# Patient Record
Sex: MALE | URBAN - METROPOLITAN AREA
[De-identification: names, ages, dates, MRNs, and addresses within clinical notes are randomized per-mention and may not be internally consistent; named-entity substitution may affect disease eponyms.]

---

## 2019-04-17 ENCOUNTER — TELEPHONE (OUTPATIENT)
Dept: PULMONOLOGY | Age: 74
End: 2019-04-17

## 2019-04-17 NOTE — TELEPHONE ENCOUNTER
Pt called office requesting an appt with Dr Manav Michael- pt said Dr Manav Michael put him on 02  3/4 yrs ago. No records in [address] [place] [address] - nothing found that pt was ever seen in our office. I informed him we will need a new pt referral.  Pt voiced understanding.

## 2020-07-06 LAB — DIAGNOSTIC PSA: < 0.06 NG/ML (ref 0–4)

## 2020-12-14 LAB
BACTERIA, URINE: ABNORMAL
BILIRUBIN URINE: NEGATIVE
BLOOD, URINE: ABNORMAL
CASTS UA: ABNORMAL
CLARITY: ABNORMAL
COLOR, URINE: ABNORMAL
CRYSTALS, UA: ABNORMAL
DIAGNOSTIC PSA: < 0.06 NG/ML (ref 0–4)
GLUCOSE URINE: NEGATIVE MG/DL
KETONES, URINE: NEGATIVE MG/DL
LEUKOCYTE ESTERASE, URINE: NEGATIVE
MUCUS, URINE: ABNORMAL
NITRITE, URINE: NEGATIVE
PH UA: 6 (ref 5–8.5)
PROTEIN UA: ABNORMAL MG/DL
RBC URINE: ABNORMAL (ref 0–2)
SPECIFIC GRAVITY, URINE: 1.02 MG/DL (ref 1–1.03)
SQUAMOUS EPITHELIAL: ABNORMAL
TRICHOMONAS, URINE: ABNORMAL
UROBILINOGEN, URINE: 1 MG/DL (ref 0.2–1)
WBC URINE: ABNORMAL (ref 0–4)
YEAST, URINE: ABNORMAL

## 2021-07-23 LAB
PROSTATE SPECIFIC ANTIGEN FREE: <0.1 UG/L
PROSTATE SPECIFIC ANTIGEN PERCENT FREE: NORMAL %
PROSTATIC SPECIFIC AG: <0.1 UG/L (ref 0–4)

## 2022-06-09 LAB — DIAGNOSTIC PSA: < 0.06 NG/ML (ref 0–4)

## 2024-10-07 LAB — DIAGNOSTIC PSA: < 0.06 NG/ML (ref 0–4)

## 2025-06-03 ENCOUNTER — ANESTHESIA EVENT (OUTPATIENT)
Dept: OPERATING ROOM | Age: 80
End: 2025-06-03
Payer: MEDICARE

## 2025-06-04 ENCOUNTER — HOSPITAL ENCOUNTER (OUTPATIENT)
Age: 80
Setting detail: OUTPATIENT SURGERY
Discharge: HOME | End: 2025-06-04
Attending: INTERNAL MEDICINE | Admitting: INTERNAL MEDICINE
Payer: MEDICARE

## 2025-06-04 ENCOUNTER — ANESTHESIA (OUTPATIENT)
Dept: OPERATING ROOM | Age: 80
End: 2025-06-04
Payer: MEDICARE

## 2025-06-04 VITALS
DIASTOLIC BLOOD PRESSURE: 70 MMHG | WEIGHT: 207 LBS | BODY MASS INDEX: 31.37 KG/M2 | HEIGHT: 68 IN | HEART RATE: 56 BPM | TEMPERATURE: 97.5 F | OXYGEN SATURATION: 94 % | RESPIRATION RATE: 17 BRPM | SYSTOLIC BLOOD PRESSURE: 116 MMHG

## 2025-06-04 DIAGNOSIS — D64.9 ANEMIA, UNSPECIFIED TYPE: ICD-10-CM

## 2025-06-04 LAB — GLUCOSE BLD-MCNC: 129 MG/DL (ref 75–110)

## 2025-06-04 PROCEDURE — 82947 ASSAY GLUCOSE BLOOD QUANT: CPT

## 2025-06-04 PROCEDURE — 7100000011 HC PHASE II RECOVERY - ADDTL 15 MIN: Performed by: INTERNAL MEDICINE

## 2025-06-04 PROCEDURE — 7100000010 HC PHASE II RECOVERY - FIRST 15 MIN: Performed by: INTERNAL MEDICINE

## 2025-06-04 PROCEDURE — 3609012400 HC EGD TRANSORAL BIOPSY SINGLE/MULTIPLE: Performed by: INTERNAL MEDICINE

## 2025-06-04 PROCEDURE — 2580000003 HC RX 258: Performed by: ANESTHESIOLOGY

## 2025-06-04 PROCEDURE — 6360000002 HC RX W HCPCS

## 2025-06-04 PROCEDURE — 88305 TISSUE EXAM BY PATHOLOGIST: CPT

## 2025-06-04 PROCEDURE — 2709999900 HC NON-CHARGEABLE SUPPLY: Performed by: INTERNAL MEDICINE

## 2025-06-04 PROCEDURE — 3700000000 HC ANESTHESIA ATTENDED CARE: Performed by: INTERNAL MEDICINE

## 2025-06-04 PROCEDURE — 88342 IMHCHEM/IMCYTCHM 1ST ANTB: CPT

## 2025-06-04 RX ORDER — TRAZODONE HYDROCHLORIDE 50 MG/1
50 TABLET ORAL NIGHTLY
COMMUNITY
Start: 2025-01-06

## 2025-06-04 RX ORDER — PROPOFOL 10 MG/ML
INJECTION, EMULSION INTRAVENOUS
Status: DISCONTINUED | OUTPATIENT
Start: 2025-06-04 | End: 2025-06-04 | Stop reason: SDUPTHER

## 2025-06-04 RX ORDER — SODIUM CHLORIDE 0.9 % (FLUSH) 0.9 %
5-40 SYRINGE (ML) INJECTION EVERY 12 HOURS SCHEDULED
Status: DISCONTINUED | OUTPATIENT
Start: 2025-06-04 | End: 2025-06-04 | Stop reason: HOSPADM

## 2025-06-04 RX ORDER — SODIUM CHLORIDE 9 MG/ML
INJECTION, SOLUTION INTRAVENOUS CONTINUOUS
Status: DISCONTINUED | OUTPATIENT
Start: 2025-06-04 | End: 2025-06-04 | Stop reason: HOSPADM

## 2025-06-04 RX ORDER — ROSUVASTATIN CALCIUM 20 MG/1
20 TABLET, COATED ORAL EVERY MORNING
COMMUNITY

## 2025-06-04 RX ORDER — TIMOLOL MALEATE 5 MG/ML
1 SOLUTION/ DROPS OPHTHALMIC EVERY MORNING
COMMUNITY
Start: 2025-04-14

## 2025-06-04 RX ORDER — LIDOCAINE HYDROCHLORIDE 10 MG/ML
1 INJECTION, SOLUTION EPIDURAL; INFILTRATION; INTRACAUDAL; PERINEURAL
Status: DISCONTINUED | OUTPATIENT
Start: 2025-06-05 | End: 2025-06-04 | Stop reason: HOSPADM

## 2025-06-04 RX ORDER — LIDOCAINE HYDROCHLORIDE 20 MG/ML
INJECTION, SOLUTION EPIDURAL; INFILTRATION; INTRACAUDAL; PERINEURAL
Status: DISCONTINUED | OUTPATIENT
Start: 2025-06-04 | End: 2025-06-04 | Stop reason: SDUPTHER

## 2025-06-04 RX ORDER — SODIUM CHLORIDE 0.9 % (FLUSH) 0.9 %
5-40 SYRINGE (ML) INJECTION PRN
Status: DISCONTINUED | OUTPATIENT
Start: 2025-06-04 | End: 2025-06-04 | Stop reason: HOSPADM

## 2025-06-04 RX ORDER — SODIUM CHLORIDE 9 MG/ML
INJECTION, SOLUTION INTRAVENOUS PRN
Status: DISCONTINUED | OUTPATIENT
Start: 2025-06-04 | End: 2025-06-04 | Stop reason: HOSPADM

## 2025-06-04 RX ORDER — SODIUM CHLORIDE, SODIUM LACTATE, POTASSIUM CHLORIDE, CALCIUM CHLORIDE 600; 310; 30; 20 MG/100ML; MG/100ML; MG/100ML; MG/100ML
INJECTION, SOLUTION INTRAVENOUS CONTINUOUS
Status: DISCONTINUED | OUTPATIENT
Start: 2025-06-04 | End: 2025-06-04 | Stop reason: HOSPADM

## 2025-06-04 RX ADMIN — LIDOCAINE HYDROCHLORIDE 100 MG: 20 INJECTION, SOLUTION EPIDURAL; INFILTRATION; INTRACAUDAL; PERINEURAL at 10:31

## 2025-06-04 RX ADMIN — SODIUM CHLORIDE, SODIUM LACTATE, POTASSIUM CHLORIDE, AND CALCIUM CHLORIDE: .6; .31; .03; .02 INJECTION, SOLUTION INTRAVENOUS at 09:35

## 2025-06-04 RX ADMIN — PROPOFOL 50 MG: 10 INJECTION, EMULSION INTRAVENOUS at 10:32

## 2025-06-04 RX ADMIN — PROPOFOL 50 MG: 10 INJECTION, EMULSION INTRAVENOUS at 10:27

## 2025-06-04 ASSESSMENT — PAIN - FUNCTIONAL ASSESSMENT
PAIN_FUNCTIONAL_ASSESSMENT: 0-10
PAIN_FUNCTIONAL_ASSESSMENT: FACE, LEGS, ACTIVITY, CRY, AND CONSOLABILITY (FLACC)

## 2025-06-04 ASSESSMENT — LIFESTYLE VARIABLES: SMOKING_STATUS: 0

## 2025-06-04 NOTE — OP NOTE
was taken to the recovery area in good condition.    Findings::   Esophagus: Normal.  GE junction, squamocolumnar junction, hiatus 42 cm.   Stomach: Normal.  Biopsied  Duodenum: Normal.  Biopsied    Recommendations: Await path.  Follow-up in office as previously arranged    Electronically signed by Pierre Stover DO on 6/4/2025 at 10:34 AM

## 2025-06-04 NOTE — ANESTHESIA PRE PROCEDURE
Department of Anesthesiology  Preprocedure Note       Name:  Chris Lou   Age:  80 y.o.  :  1945                                          MRN:  [id number]         Date:  2025      Surgeon: Surgeon(s):  Pierre Stover DO    Procedure: Procedure(s):  ESOPHAGOGASTRODUODENOSCOPY    Medications prior to admission:   Prior to Admission medications    Medication Sig Start Date End Date Taking? Authorizing Provider   rosuvastatin (CRESTOR) 20 MG tablet Take 1 tablet by mouth every morning   Yes Saumya Martínez MD   timolol (TIMOPTIC) 0.5 % ophthalmic solution Place 1 drop into both eyes every morning 25  Yes Saumya Martínez MD   traZODone (DESYREL) 50 MG tablet Take 1 tablet by mouth nightly 25  Yes Saumya Martínez MD   docusate sodium (COLACE) 100 MG capsule Take 1 capsule by mouth 2 times daily 12/3/15  Yes Ranjeet Freeman MD   albuterol (PROVENTIL HFA;VENTOLIN HFA) 108 (90 BASE) MCG/ACT inhaler Inhale 2 puffs into the lungs every 6 hours as needed for Wheezing   Yes Provider, MD Saumya   FIBER PO Take 1 tablet by mouth daily   Yes Saumya Martínez MD   glimepiride (AMARYL) 2 MG tablet Take 1 tablet by mouth every morning (before breakfast)   Yes Saumya Martínez MD   bisoprolol-hydrochlorothiazide (ZIAC) 5-6.25 MG per tablet Take 1 tablet by mouth daily   Yes Saumya Martínez MD   amLODIPine (NORVASC) 10 MG tablet Take 1 tablet by mouth daily   Yes Saumya Martínez MD   aspirin 81 MG tablet Take 1 tablet by mouth daily   Yes Saumya Martínez MD       Current medications:    Current Facility-Administered Medications   Medication Dose Route Frequency Provider Last Rate Last Admin   • [START ON 2025] lidocaine PF 1 % injection 1 mL  1 mL IntraDERmal Once PRN Eitan Melgoza DO       • 0.9 % sodium chloride infusion   IntraVENous Continuous Eitan Melgoza DO       • lactated ringers infusion   IntraVENous Continuous Eitan Melgoza DO

## 2025-06-04 NOTE — DISCHARGE INSTRUCTIONS
POST-ENDOSCOPY INSTRUCTIONS:    1. ACTIVITY   No driving, operating machinery, or making important decisions for 24 hours.    Resume normal activity after 24 hours.  You may return to work after 24 hours.    2. DIET     __x__ (EGD/ERCP):  Do not eat or drink for one hour after your exam.  You may then   try a sip of water, and if you are able to swallow as usual you may advance to a   regular diet.     ____ (Colonscopy/Flex Sig):  Resume your usual diet unless specified below.       ____ Diet Modification:    3. MEDICATIONS (Do not consume alcohol, tranquilizers, or sleeping medications for 24           hours unless advised by your physician)       ___x__ Resume your usual medications    4. PHYSICIAN FOLLOW-UP        ___x_ Please call the office for an appointment/further instructions.          ____ See your family physician.    5. ADDITIONAL INSTRUCTIONS          6. NORMAL CHANGES YOU MAY EXPERIENCE AFTER ENDOSCOPY:          EGD/ERCP     COLONOSCOPY      Sore throat after EGD/ERCP   Passing of gas for several hours after      A bloated feeling and belching from  Some mild abdominal cramping   air in stomach    If a biopsy/polypectomy was done, you      If a biopsy was done, you may spit   may see some spotting of blood   up some blood tinged mucous  You may feel fatigued for the next 24-48         hours due to the prep and sedation    7. CALL YOUR PHYSICIAN IF YOU EXPERIENCE ANY OF THE FOLLOWING:      A.  Passing blood rectally or vomiting blood (color may be red or black)      B.  Severe abdominal pain or tenderness (that is not relieved by passing air)      C.  Fever, chills, or excessive sweating      D.  Persistent nausea or vomiting      E.  Redness or swelling at the IV site

## 2025-06-04 NOTE — ANESTHESIA POSTPROCEDURE EVALUATION
Department of Anesthesiology  Postprocedure Note    Patient: Chrsi Lou  MRN: [id number]  YOB: 1945  Date of evaluation: 6/4/2025    Procedure Summary       Date: 06/04/25 Room / Location: [address]    Anesthesia Start: 1023 Anesthesia Stop: 1039    Procedure: ESOPHAGOGASTRODUODENOSCOPY BIOPSY (Esophagus) Diagnosis:       Anemia, unspecified type      (Anemia, unspecified type [D64.9])    Surgeons: Pierre Stover DO Responsible Provider:     Anesthesia Type: general, TIVA ASA Status: 3            Anesthesia Type: No value filed.    Elly Phase I:      Elly Phase II: Elly Score: 10    Anesthesia Post Evaluation    Patient location during evaluation: PACU  Patient participation: complete - patient participated  Level of consciousness: awake and alert  Airway patency: patent  Nausea & Vomiting: no nausea and no vomiting  Cardiovascular status: hemodynamically stable  Respiratory status: acceptable  Hydration status: stable  Pain management: adequate    No notable events documented.

## 2025-06-04 NOTE — H&P
GI History and Physical    Pt Name: Chris Lou  MRN: [id number]  YOB: 1945  Date of evaluation: 6/4/2025  Primary Care Physician: Christopher Miner MD    SUBJECTIVE:   History of Chief Complaint:    This is Chris Lou a 80 y.o. male who presents today for an EGD  by Dr BRAN  for EVALUATION OF UNSPECIFIED ANEMIA..     Denies history of ulcers, hiatal hernia, acid reflux/GERD, or IBS. Previous EGD: No. PAtient today denies  fever, chills, night sweats, pain or unexplained weight loss. HE DENIES ANY NAUSEA ,VOMITING OR GERD SYMPTOMS.NO BLEEDING ,NO DARK OR TARRY STOOLS.NO VOMITING BLOOD. PATIENT HAS PERSONAL HISTORY OF PROSTATE CANCER TREATED WITH PROSTATECTOMY FOLLOWED BY RADIATION TREATMENTS X 37. HE HAS OCCASIONAL HEMATURIA.    Past Medical History    has a past medical history of Anemia, Cervical spondylosis, Full dentures, Glaucoma, History of colonic polyps, Hyperlipidemia, Hypertension, Obesity, Seasonal allergies, Type II or unspecified type diabetes mellitus without mention of complication, not stated as uncontrolled, and Wears glasses.  Past Surgical History   has a past surgical history that includes Colonoscopy (2015); Umbilical hernia repair; and Prostatectomy (12/02/15).  Medications  Prior to Admission medications    Medication Sig Start Date End Date Taking? Authorizing Provider   rosuvastatin (CRESTOR) 20 MG tablet Take 1 tablet by mouth every morning   Yes Saumya Martínez MD   timolol (TIMOPTIC) 0.5 % ophthalmic solution Place 1 drop into both eyes every morning 4/14/25  Yes Saumya Martínez MD   traZODone (DESYREL) 50 MG tablet Take 1 tablet by mouth nightly 1/6/25  Yes Saumya Martínez MD   docusate sodium (COLACE) 100 MG capsule Take 1 capsule by mouth 2 times daily 12/3/15  Yes Ranjeet Freeman MD   albuterol (PROVENTIL HFA;VENTOLIN HFA) 108 (90 BASE) MCG/ACT inhaler Inhale 2 puffs into the lungs every 6 hours as needed for Wheezing   Yes Saumya Martínez MD

## 2025-06-06 LAB — SURGICAL PATHOLOGY REPORT: NORMAL

## 2025-07-16 ENCOUNTER — HOSPITAL ENCOUNTER (OUTPATIENT)
Age: 80
Setting detail: OUTPATIENT SURGERY
Discharge: HOME | End: 2025-07-16
Attending: INTERNAL MEDICINE | Admitting: INTERNAL MEDICINE
Payer: MEDICARE

## 2025-07-16 ENCOUNTER — ANESTHESIA EVENT (OUTPATIENT)
Dept: OPERATING ROOM | Age: 80
End: 2025-07-16
Payer: MEDICARE

## 2025-07-16 ENCOUNTER — ANESTHESIA (OUTPATIENT)
Dept: OPERATING ROOM | Age: 80
End: 2025-07-16
Payer: MEDICARE

## 2025-07-16 VITALS
OXYGEN SATURATION: 92 % | DIASTOLIC BLOOD PRESSURE: 72 MMHG | HEART RATE: 68 BPM | RESPIRATION RATE: 16 BRPM | TEMPERATURE: 97.7 F | WEIGHT: 202 LBS | HEIGHT: 67 IN | SYSTOLIC BLOOD PRESSURE: 118 MMHG | BODY MASS INDEX: 31.71 KG/M2

## 2025-07-16 LAB
GLUCOSE BLD-MCNC: 117 MG/DL (ref 75–110)
GLUCOSE BLD-MCNC: 99 MG/DL (ref 75–110)

## 2025-07-16 PROCEDURE — 2709999900 HC NON-CHARGEABLE SUPPLY: Performed by: INTERNAL MEDICINE

## 2025-07-16 PROCEDURE — 6360000002 HC RX W HCPCS: Performed by: NURSE ANESTHETIST, CERTIFIED REGISTERED

## 2025-07-16 PROCEDURE — 82947 ASSAY GLUCOSE BLOOD QUANT: CPT

## 2025-07-16 PROCEDURE — 7100000010 HC PHASE II RECOVERY - FIRST 15 MIN: Performed by: INTERNAL MEDICINE

## 2025-07-16 PROCEDURE — 3700000000 HC ANESTHESIA ATTENDED CARE: Performed by: INTERNAL MEDICINE

## 2025-07-16 PROCEDURE — 7100000011 HC PHASE II RECOVERY - ADDTL 15 MIN: Performed by: INTERNAL MEDICINE

## 2025-07-16 PROCEDURE — 3609027000 HC COLONOSCOPY: Performed by: INTERNAL MEDICINE

## 2025-07-16 PROCEDURE — 2580000003 HC RX 258: Performed by: ANESTHESIOLOGY

## 2025-07-16 PROCEDURE — 3700000001 HC ADD 15 MINUTES (ANESTHESIA): Performed by: INTERNAL MEDICINE

## 2025-07-16 RX ORDER — SODIUM CHLORIDE 0.9 % (FLUSH) 0.9 %
5-40 SYRINGE (ML) INJECTION PRN
Status: DISCONTINUED | OUTPATIENT
Start: 2025-07-16 | End: 2025-07-16 | Stop reason: HOSPADM

## 2025-07-16 RX ORDER — FEXOFENADINE HCL 60 MG/1
60 TABLET, FILM COATED ORAL DAILY
COMMUNITY

## 2025-07-16 RX ORDER — PROPOFOL 10 MG/ML
INJECTION, EMULSION INTRAVENOUS
Status: DISCONTINUED | OUTPATIENT
Start: 2025-07-16 | End: 2025-07-16 | Stop reason: SDUPTHER

## 2025-07-16 RX ORDER — SODIUM CHLORIDE 9 MG/ML
INJECTION, SOLUTION INTRAVENOUS PRN
Status: DISCONTINUED | OUTPATIENT
Start: 2025-07-16 | End: 2025-07-16 | Stop reason: HOSPADM

## 2025-07-16 RX ORDER — SODIUM CHLORIDE 9 MG/ML
INJECTION, SOLUTION INTRAVENOUS CONTINUOUS
Status: DISCONTINUED | OUTPATIENT
Start: 2025-07-16 | End: 2025-07-16 | Stop reason: HOSPADM

## 2025-07-16 RX ORDER — MULTIVITAMIN WITH IRON
1 TABLET ORAL DAILY
COMMUNITY

## 2025-07-16 RX ORDER — SODIUM CHLORIDE, SODIUM LACTATE, POTASSIUM CHLORIDE, CALCIUM CHLORIDE 600; 310; 30; 20 MG/100ML; MG/100ML; MG/100ML; MG/100ML
INJECTION, SOLUTION INTRAVENOUS CONTINUOUS
Status: DISCONTINUED | OUTPATIENT
Start: 2025-07-16 | End: 2025-07-16 | Stop reason: HOSPADM

## 2025-07-16 RX ORDER — SODIUM CHLORIDE 0.9 % (FLUSH) 0.9 %
5-40 SYRINGE (ML) INJECTION EVERY 12 HOURS SCHEDULED
Status: DISCONTINUED | OUTPATIENT
Start: 2025-07-16 | End: 2025-07-16 | Stop reason: HOSPADM

## 2025-07-16 RX ORDER — LIDOCAINE HYDROCHLORIDE 20 MG/ML
INJECTION, SOLUTION EPIDURAL; INFILTRATION; INTRACAUDAL; PERINEURAL
Status: DISCONTINUED | OUTPATIENT
Start: 2025-07-16 | End: 2025-07-16 | Stop reason: SDUPTHER

## 2025-07-16 RX ORDER — ISOSORBIDE MONONITRATE 30 MG/1
30 TABLET, EXTENDED RELEASE ORAL DAILY
COMMUNITY

## 2025-07-16 RX ORDER — LIDOCAINE HYDROCHLORIDE 10 MG/ML
1 INJECTION, SOLUTION EPIDURAL; INFILTRATION; INTRACAUDAL; PERINEURAL
Status: DISCONTINUED | OUTPATIENT
Start: 2025-07-17 | End: 2025-07-16 | Stop reason: HOSPADM

## 2025-07-16 RX ADMIN — LIDOCAINE HYDROCHLORIDE 100 MG: 20 INJECTION, SOLUTION EPIDURAL; INFILTRATION; INTRACAUDAL; PERINEURAL at 08:10

## 2025-07-16 RX ADMIN — PROPOFOL 50 MG: 10 INJECTION, EMULSION INTRAVENOUS at 08:21

## 2025-07-16 RX ADMIN — SODIUM CHLORIDE, SODIUM LACTATE, POTASSIUM CHLORIDE, AND CALCIUM CHLORIDE: .6; .31; .03; .02 INJECTION, SOLUTION INTRAVENOUS at 07:17

## 2025-07-16 RX ADMIN — PROPOFOL 50 MG: 10 INJECTION, EMULSION INTRAVENOUS at 08:18

## 2025-07-16 RX ADMIN — PROPOFOL 60 MG: 10 INJECTION, EMULSION INTRAVENOUS at 08:10

## 2025-07-16 RX ADMIN — PROPOFOL 40 MG: 10 INJECTION, EMULSION INTRAVENOUS at 08:14

## 2025-07-16 RX ADMIN — PROPOFOL 20 MG: 10 INJECTION, EMULSION INTRAVENOUS at 08:12

## 2025-07-16 ASSESSMENT — ENCOUNTER SYMPTOMS: SHORTNESS OF BREATH: 0

## 2025-07-16 NOTE — COLONOSCOPY
[facility]  COLONOSCOPY    Patient: Chris Lou   Date:  2025   : 1945       Med Rec#: [id number]     Procedure:  Colonoscopy   Indication: History of polyps, anemia    Findings   Hemorrhoids, internal  Diverticulosis, left-sided    Recommendations  At this point, the risk of colon cancer screening/polyp surveillance is greater than the benefit    Appropriate Photos Taken: Yes  Specimen(s) Removed: As stated above  Previous Colonoscopy:    Withdrawal Time: 11 minutes  Estimated Blood Loss: None  Anesthesia:  MAC    Complications:    Sub-optimal bowel prep  Description of Procedure:  Informed consent was obtained after explanation of the procedure including indications, description of the procedure,  benefits and possible risks and complications of the procedure, and alternatives.  All questions were answered.  The patient's history was reviewed and a directed physical examination was performed prior to the procedure.  The patient was monitored throughout the procedure with pulse oximetry and periodic assessment of vital signs.  With the patient initially in the left lateral decubitus position, a digital rectal examination was performed.  The video endoscope was placed in the patient's rectum and advanced without difficulty  to the terminal ileum.  Cecal landmarks were identified. the prep was adequate.   Examination of the mucosa and the anatomy was performed during both introduction and withdrawal of the endoscope.     Peter Gil M.D.  2025 at 7:00 AM

## 2025-07-16 NOTE — H&P
Interval H&P Note    Pt Name: Chris Lou  MRN: [id number]  YOB: 1945  Date of evaluation: 7/16/2025      [x] I have reviewed in [facility] the internal medicine progress note by Dr. Briscoe dated 7/7/25 for an Interval History and Physical note (attached below).      [x] I have examined  Chris Lou, a 80 y.o. male.There are no changes to the patient who is scheduled for COLONOSCOPY DIAGNOSTIC by Darek Gil MD for Anemia, unspecified type. Patient reports he was recently treated for H. Pylori by his PCP with 3 antibiotics that he finished a few days ago. He reports the antibiotics caused him to have dark stools that have now resolved. He denies diarrhea alternating with constipation, nausea, vomiting, abdominal pain or unintentional weight loss. Patient followed bowel prep until watery clear. Previous colonoscopy in 2022 with polyps removed. No FH colon cancer or polyps. He denies fever, chills, wheezing, cough, increased SOB, chest pain, open sores or wounds. Known diabetes, POC . Last aspirin 7/15/25. Received cardiac clearance for procedure (see media tab).     COPD - reports well controlled. Chronic BO. Last rescue inhaler use over a week ago.    Vital signs: /63   Pulse 65   Temp 97.3 °F (36.3 °C)   Resp 18   Ht 1.702 m (5' 7\")   Wt 91.6 kg (202 lb)   SpO2 94%   BMI 31.64 kg/m²     Allergies:  Pcn [penicillins]    Medications:    Prior to Admission medications    Medication Sig Start Date End Date Taking? Authorizing Provider   rosuvastatin (CRESTOR) 20 MG tablet Take 1 tablet by mouth every morning   Yes ProviderSaumya MD   timolol (TIMOPTIC) 0.5 % ophthalmic solution Place 1 drop into both eyes every morning 4/14/25  Yes ProviderSaumya MD   traZODone (DESYREL) 50 MG tablet Take 1 tablet by mouth nightly 1/6/25  Yes ProviderSaumya MD   docusate sodium (COLACE) 100 MG capsule Take 1 capsule by mouth 2 times daily 12/3/15  Yes Ranjeet Freeman MD  2  [DISCONTINUED] valACYclovir (VALTREX) 1000 mg tablet Two tablets twice a day for 1 day for each outbreak of cold sores 20 tablet 5  albuterol (PROVENTIL HFA;VENTOLIN HFA) 90 mcg/actuation inhaler Inhale 2 puffs 4 (four) times a day as needed for wheezing. 18 g 5  amLODIPine (NORVASC) 10 mg tablet Take 1 tablet (10 mg total) by mouth in the morning. 90 tablet 3  bisoprolol-hydroCHLOROthiazide (ZIAC) 5-6.25 mg per tablet Take 1 tablet by mouth in the morning. 90 tablet 3  ferrous sulfate 325 (65 FE) MG tablet Take 1 tablet (325 mg total) by mouth in the morning. 30 tablet 0  glimepiride (AMARYL) 2 mg tablet Take 1 tablet (2 mg total) by mouth in the morning and 1 tablet (2 mg total) before bedtime. 180 tablet 3  lancets (freestyle) 28 gauge misc Test sugars twice a day 100 each 11  rosuvastatin (CRESTOR) 20 mg tablet Take 1 tablet (20 mg total) by mouth in the morning. 90 tablet 3  timolol (TIMOPTIC) 0.5 % ophthalmic solution INSTILL 1 DROP INTO BOTH EYES IN THE MORNING 15 mL 3  traZODone (DESYREL) 50 mg tablet Take 1 tablet (50 mg total) by mouth nightly. 90 tablet 2  umeclidinium (INCRUSE ELLIPTA) 62.5 mcg/actuation blister with device Inhale 1 puff in the morning. (Patient not taking: Reported on 1/23/2025) 90 each 2  valACYclovir (VALTREX) 1000 mg tablet Two tablets twice a day for 1 day for each outbreak of cold sores 20 tablet 5  [DISCONTINUED] cyclobenzaprine (FLEXERIL) 5 mg tablet Take 1 tablet (5 mg total) by mouth every 8 (eight) hours as needed for muscle spasms. (Patient not taking: Reported on 1/23/2025) 30 tablet 1  [DISCONTINUED] glimepiride (AMARYL) 2 mg tablet Take 1 tablet (2 mg total) by mouth in the morning and 1 tablet (2 mg total) before bedtime. 180 tablet 3  [DISCONTINUED] methocarbamoL (ROBAXIN) 750 mg tablet Take 1 tablet (750 mg total) by mouth in the morning and 1 tablet (750 mg total) at noon and 1 tablet (750 mg total) in the evening and 1 tablet (750 mg total) before bedtime. (Patient

## 2025-07-16 NOTE — ANESTHESIA POSTPROCEDURE EVALUATION
Department of Anesthesiology  Postprocedure Note    Patient: Chris Lou  MRN: [id number]  YOB: 1945  Date of evaluation: 7/16/2025    Procedure Summary       Date: 07/16/25 Room / Location: [address]    Anesthesia Start: 0806 Anesthesia Stop: 0827    Procedure: COLONOSCOPY DIAGNOSTIC (Lower GI Region) Diagnosis:       Anemia, unspecified type      (Anemia, unspecified type [D64.9])    Surgeons: Darek Gil MD Responsible Provider: Gisele Nugent MD    Anesthesia Type: General, MAC ASA Status: 3            Anesthesia Type: General, MAC    Elly Phase I:      Elly Phase II: Elly Score: 10    Anesthesia Post Evaluation    Airway patency: patent  Cardiovascular status: hemodynamically stable  Respiratory status: acceptable    No notable events documented.

## 2025-07-16 NOTE — ANESTHESIA PRE PROCEDURE
Department of Anesthesiology  Preprocedure Note       Name:  Chris Lou   Age:  80 y.o.  :  1945                                          MRN:  [id number]         Date:  2025      Surgeon: Surgeon(s):  Darek Gil MD    Procedure: Procedure(s):  COLONOSCOPY DIAGNOSTIC    Medications prior to admission:   Prior to Admission medications    Medication Sig Start Date End Date Taking? Authorizing Provider   rosuvastatin (CRESTOR) 20 MG tablet Take 1 tablet by mouth every morning   Yes Saumya Martínez MD   timolol (TIMOPTIC) 0.5 % ophthalmic solution Place 1 drop into both eyes every morning 25  Yes Saumya Martínez MD   traZODone (DESYREL) 50 MG tablet Take 1 tablet by mouth nightly 25  Yes Saumya Martínez MD   docusate sodium (COLACE) 100 MG capsule Take 1 capsule by mouth 2 times daily 12/3/15  Yes Ranjeet Freeman MD   albuterol (PROVENTIL HFA;VENTOLIN HFA) 108 (90 BASE) MCG/ACT inhaler Inhale 2 puffs into the lungs every 6 hours as needed for Wheezing   Yes Provider, MD Saumya   FIBER PO Take 1 tablet by mouth daily   Yes Saumya Martínez MD   glimepiride (AMARYL) 2 MG tablet Take 1 tablet by mouth every morning (before breakfast)   Yes Saumya Martínez MD   amLODIPine (NORVASC) 10 MG tablet Take 1 tablet by mouth daily   Yes Saumya Martínez MD   aspirin 81 MG tablet Take 1 tablet by mouth daily   Yes Saumya Martínez MD   bisoprolol-hydrochlorothiazide (ZIAC) 5-6.25 MG per tablet Take 1 tablet by mouth daily    Saumya Martínez MD       Current medications:    Current Facility-Administered Medications   Medication Dose Route Frequency Provider Last Rate Last Admin   • [START ON 2025] lidocaine PF 1 % injection 1 mL  1 mL IntraDERmal Once PRN Eitan Melgoza DO       • 0.9 % sodium chloride infusion   IntraVENous Continuous Eitan Melgoza DO       • lactated ringers infusion   IntraVENous Continuous Eitan Melgoza DO

## (undated) DEVICE — 4-PORT MANIFOLD: Brand: NEPTUNE 2

## (undated) DEVICE — FORCEPS BX L240CM WRK CHN 2.8MM STD CAP W/ NDL MIC MESH

## (undated) DEVICE — UNDERPAD HOSP W30XL36IN GRN POLYPR HI ABSRB DISP

## (undated) DEVICE — MEDICINE CUP, GRADUATED, STER: Brand: MEDLINE

## (undated) DEVICE — ENDOSCOPIC KIT 1.1+ 10 FT DE 2 GWN AAMI LEVEL 3

## (undated) DEVICE — BITE BLOCK ENDOSCP AD 60 FR W/ ADJ STRP PLAS GRN BLOX